# Patient Record
Sex: FEMALE | Race: BLACK OR AFRICAN AMERICAN | Employment: UNEMPLOYED | URBAN - METROPOLITAN AREA
[De-identification: names, ages, dates, MRNs, and addresses within clinical notes are randomized per-mention and may not be internally consistent; named-entity substitution may affect disease eponyms.]

---

## 2021-10-07 ENCOUNTER — HOSPITAL ENCOUNTER (EMERGENCY)
Age: 46
Discharge: HOME | End: 2021-10-07
Attending: EMERGENCY MEDICINE
Payer: COMMERCIAL

## 2021-10-07 ENCOUNTER — APPOINTMENT (OUTPATIENT)
Dept: GENERAL RADIOLOGY | Age: 46
End: 2021-10-07
Attending: EMERGENCY MEDICINE
Payer: COMMERCIAL

## 2021-10-07 VITALS
OXYGEN SATURATION: 100 % | HEIGHT: 71 IN | SYSTOLIC BLOOD PRESSURE: 135 MMHG | TEMPERATURE: 98.9 F | WEIGHT: 293 LBS | BODY MASS INDEX: 41.02 KG/M2 | DIASTOLIC BLOOD PRESSURE: 99 MMHG | HEART RATE: 84 BPM | RESPIRATION RATE: 18 BRPM

## 2021-10-07 DIAGNOSIS — T14.8XXA MUSCLE STRAIN: Primary | ICD-10-CM

## 2021-10-07 PROCEDURE — 73610 X-RAY EXAM OF ANKLE: CPT

## 2021-10-07 PROCEDURE — 74011250637 HC RX REV CODE- 250/637: Performed by: PHYSICIAN ASSISTANT

## 2021-10-07 PROCEDURE — 99283 EMERGENCY DEPT VISIT LOW MDM: CPT

## 2021-10-07 RX ORDER — IBUPROFEN 800 MG/1
800 TABLET ORAL
Qty: 30 TABLET | Refills: 0 | Status: SHIPPED | OUTPATIENT
Start: 2021-10-07 | End: 2021-10-17

## 2021-10-07 RX ORDER — IBUPROFEN 800 MG/1
800 TABLET ORAL
Status: COMPLETED | OUTPATIENT
Start: 2021-10-07 | End: 2021-10-07

## 2021-10-07 RX ADMIN — IBUPROFEN 800 MG: 800 TABLET, FILM COATED ORAL at 17:55

## 2021-10-07 NOTE — ED PROVIDER NOTES
Patient states that about 230 to 3:00 this afternoon she was pushing a shopping cart in a grocery store and hit some water her left leg went suddenly backwards, she caught herself on the shopping cart and did not fall to the ground but has pain to the entire left leg. She has not used any over-the-counter medicines for pain she was able to drive herself here    The history is provided by the patient. Leg Pain   This is a new problem. The current episode started 1 to 2 hours ago. The problem occurs constantly. The problem has not changed since onset. Pain location: Entire left leg and ankle. The quality of the pain is described as aching. The pain is at a severity of 10/10. The pain is mild. Associated symptoms include stiffness. The symptoms are aggravated by activity and palpation. She has tried nothing for the symptoms. The treatment provided no relief. There has been no history of extremity trauma.         Past Medical History:   Diagnosis Date    Anemia     Asthma     Hyperlipidemia     Obesity     Other ill-defined conditions     aneimia       Past Surgical History:   Procedure Laterality Date    HX OTHER SURGICAL      hernia    HX TUBAL LIGATION           Family History:   Problem Relation Age of Onset    Diabetes Other         type II       Social History     Socioeconomic History    Marital status: SINGLE     Spouse name: Not on file    Number of children: Not on file    Years of education: Not on file    Highest education level: Not on file   Occupational History    Not on file   Tobacco Use    Smoking status: Never Smoker    Smokeless tobacco: Never Used   Substance and Sexual Activity    Alcohol use: No    Drug use: No    Sexual activity: Not Currently   Other Topics Concern    Not on file   Social History Narrative    Not on file     Social Determinants of Health     Financial Resource Strain:     Difficulty of Paying Living Expenses:    Food Insecurity:     Worried About Running Out of Food in the Last Year:    [address] in the Last Year:    Transportation Needs:     Lack of Transportation (Medical):  Lack of Transportation (Non-Medical):    Physical Activity:     Days of Exercise per Week:     Minutes of Exercise per Session:    Stress:     Feeling of Stress :    Social Connections:     Frequency of Communication with Friends and Family:     Frequency of Social Gatherings with Friends and Family:     Attends [employer] Services:     Active Member of Clubs or Organizations:     Attends Club or Organization Meetings:     Marital Status:    Intimate Partner Violence:     Fear of Current or Ex-Partner:     Emotionally Abused:     Physically Abused:     Sexually Abused: ALLERGIES: Patient has no known allergies. Review of Systems   Musculoskeletal: Positive for stiffness. All other systems reviewed and are negative. Vitals:    10/07/21 1615 10/07/21 1723   BP: (!) 135/102    Pulse: 84    Resp: 17    Temp: 98.9 °F (37.2 °C)    SpO2: 100% 100%   Weight: 140.6 kg (310 lb)    Height: 5' 11\" (1.803 m)             Physical Exam  Vitals and nursing note reviewed. Constitutional:       General: She is not in acute distress. Appearance: Normal appearance. She is well-developed. She is obese. She is not diaphoretic. HENT:      Head: Normocephalic and atraumatic. Eyes:      Pupils: Pupils are equal, round, and reactive to light. Cardiovascular:      Rate and Rhythm: Normal rate and regular rhythm. Pulmonary:      Effort: Pulmonary effort is normal.      Breath sounds: Normal breath sounds. Abdominal:      General: Bowel sounds are normal.      Palpations: Abdomen is soft. Musculoskeletal:         General: Tenderness present. Normal range of motion. Cervical back: Normal range of motion and neck supple.       Comments: Patient has diffuse tenderness to the entire left leg including buttocks area entire thigh down through the knee entire lower leg into the ankle. Full but painful range of motion of both hip knee and ankle. No pain to the lower back. No pain to the right lower extremity. Patient feels she may have twisted her left ankle and may have more pain to the ankle area, no abrasions noted to the foot intact pedal pulses Achilles tendon is intact   Skin:     General: Skin is warm. Neurological:      Mental Status: She is alert and oriented to person, place, and time. MDM  Number of Diagnoses or Management Options  Diagnosis management comments: Left ankle x-rays without any fractures.   Feel this is a muscle strain will treat with ibuprofen and ice       Amount and/or Complexity of Data Reviewed  Tests in the radiology section of CPT®: ordered and reviewed  Review and summarize past medical records: yes    Risk of Complications, Morbidity, and/or Mortality  Presenting problems: low  Diagnostic procedures: low  Management options: low    Patient Progress  Patient progress: improved         Procedures

## 2021-10-07 NOTE — ED NOTES
I have reviewed discharge instructions with the patient. The patient verbalized understanding. Patient left ED via Discharge Method: ambulatory to Home with self. Opportunity for questions and clarification provided. Patient given 1 scripts. To continue your aftercare when you leave the hospital, you may receive an automated call from our care team to check in on how you are doing. This is a free service and part of our promise to provide the best care and service to meet your aftercare needs.  If you have questions, or wish to unsubscribe from this service please call [phone]. Thank you for Choosing our [facility] Emergency Department.

## 2021-10-07 NOTE — ED TRIAGE NOTES
Pt arrives via pov c/o a slip at the grocery store today, reporting a burning sensation to the left upper leg and hip area. Pt reports did not fall all the way down. Pt denies LOC or near syncope. Reporting left ankle pain too.

## 2023-07-27 ENCOUNTER — HOSPITAL ENCOUNTER (EMERGENCY)
Age: 48
Discharge: HOME | End: 2023-07-27
Attending: EMERGENCY MEDICINE

## 2023-07-27 VITALS
RESPIRATION RATE: 18 BRPM | SYSTOLIC BLOOD PRESSURE: 142 MMHG | WEIGHT: 280 LBS | HEART RATE: 82 BPM | BODY MASS INDEX: 39.2 KG/M2 | OXYGEN SATURATION: 98 % | HEIGHT: 71 IN | DIASTOLIC BLOOD PRESSURE: 92 MMHG | TEMPERATURE: 98.4 F
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
## 2023-07-27 PROCEDURE — 99283 EMERGENCY DEPT VISIT LOW MDM: CPT

## 2023-07-27 RX ORDER — SULFAMETHOXAZOLE AND TRIMETHOPRIM 800; 160 MG/1; MG/1
1 TABLET ORAL 2 TIMES DAILY
Qty: 20 TABLET | Refills: 0 | Status: SHIPPED | OUTPATIENT
Start: 2023-07-27 | End: 2023-08-06

## 2023-07-27 ASSESSMENT — LIFESTYLE VARIABLES
HOW MANY STANDARD DRINKS CONTAINING ALCOHOL DO YOU HAVE ON A TYPICAL DAY: 1 OR 2
HOW OFTEN DO YOU HAVE A DRINK CONTAINING ALCOHOL: MONTHLY OR LESS

## 2023-07-27 ASSESSMENT — PAIN SCALES - GENERAL: PAINLEVEL_OUTOF10: 8

## 2023-07-27 ASSESSMENT — PAIN DESCRIPTION - ORIENTATION: ORIENTATION: RIGHT

## 2023-07-27 ASSESSMENT — PAIN DESCRIPTION - LOCATION: LOCATION: FACE

## 2023-07-27 ASSESSMENT — PAIN - FUNCTIONAL ASSESSMENT: PAIN_FUNCTIONAL_ASSESSMENT: 0-10

## 2023-07-27 NOTE — DISCHARGE INSTRUCTIONS
Use antibiotics twice a day with food use antibiotic ointment to area twice a day, see your primary md for recheck

## 2023-07-27 NOTE — ED TRIAGE NOTES
Pt has small scab with slight swelling and redness on the right side of her face lateral to her eye. reports feeling an insect bite at work  last week. Pt reports burning and tenderness. No fevers.

## 2024-04-17 ENCOUNTER — HOSPITAL ENCOUNTER (EMERGENCY)
Age: 49
Discharge: HOME | End: 2024-04-17
Attending: EMERGENCY MEDICINE
Payer: MEDICAID

## 2024-04-17 ENCOUNTER — APPOINTMENT (OUTPATIENT)
Dept: GENERAL RADIOLOGY | Age: 49
End: 2024-04-17
Payer: MEDICAID

## 2024-04-17 VITALS
HEART RATE: 79 BPM | BODY MASS INDEX: 39.2 KG/M2 | TEMPERATURE: 98 F | RESPIRATION RATE: 16 BRPM | WEIGHT: 280 LBS | SYSTOLIC BLOOD PRESSURE: 134 MMHG | DIASTOLIC BLOOD PRESSURE: 76 MMHG | OXYGEN SATURATION: 99 % | HEIGHT: 71 IN

## 2024-04-17 DIAGNOSIS — S80.211A ABRASION, RIGHT KNEE, INITIAL ENCOUNTER: ICD-10-CM

## 2024-04-17 DIAGNOSIS — M25.571 ACUTE RIGHT ANKLE PAIN: Primary | ICD-10-CM

## 2024-04-17 LAB — HCG UR QL: NEGATIVE

## 2024-04-17 PROCEDURE — 81025 URINE PREGNANCY TEST: CPT

## 2024-04-17 PROCEDURE — 73610 X-RAY EXAM OF ANKLE: CPT

## 2024-04-17 PROCEDURE — 6370000000 HC RX 637 (ALT 250 FOR IP): Performed by: EMERGENCY MEDICINE

## 2024-04-17 PROCEDURE — 99283 EMERGENCY DEPT VISIT LOW MDM: CPT

## 2024-04-17 RX ORDER — HYDROCODONE BITARTRATE AND ACETAMINOPHEN 7.5; 325 MG/1; MG/1
1 TABLET ORAL EVERY 8 HOURS PRN
Qty: 10 TABLET | Refills: 0 | Status: SHIPPED | OUTPATIENT
Start: 2024-04-17 | End: 2024-04-22

## 2024-04-17 RX ORDER — HYDROCODONE BITARTRATE AND ACETAMINOPHEN 7.5; 325 MG/1; MG/1
1 TABLET ORAL ONCE
Status: COMPLETED | OUTPATIENT
Start: 2024-04-17 | End: 2024-04-17

## 2024-04-17 RX ADMIN — HYDROCODONE BITARTRATE AND ACETAMINOPHEN 1 TABLET: 7.5; 325 TABLET ORAL at 20:43

## 2024-04-17 ASSESSMENT — PAIN - FUNCTIONAL ASSESSMENT
PAIN_FUNCTIONAL_ASSESSMENT: 0-10
PAIN_FUNCTIONAL_ASSESSMENT: 0-10

## 2024-04-17 ASSESSMENT — PAIN DESCRIPTION - FREQUENCY: FREQUENCY: CONTINUOUS

## 2024-04-17 ASSESSMENT — LIFESTYLE VARIABLES
HOW OFTEN DO YOU HAVE A DRINK CONTAINING ALCOHOL: MONTHLY OR LESS
HOW MANY STANDARD DRINKS CONTAINING ALCOHOL DO YOU HAVE ON A TYPICAL DAY: 1 OR 2

## 2024-04-17 ASSESSMENT — PAIN SCALES - GENERAL
PAINLEVEL_OUTOF10: 5
PAINLEVEL_OUTOF10: 10

## 2024-04-17 ASSESSMENT — PAIN DESCRIPTION - PAIN TYPE: TYPE: ACUTE PAIN

## 2024-04-17 ASSESSMENT — PAIN DESCRIPTION - ORIENTATION: ORIENTATION: RIGHT

## 2024-04-17 ASSESSMENT — PAIN DESCRIPTION - DESCRIPTORS: DESCRIPTORS: ACHING;DISCOMFORT

## 2024-04-17 ASSESSMENT — PAIN DESCRIPTION - LOCATION: LOCATION: ANKLE

## 2024-04-17 NOTE — ED TRIAGE NOTES
Pt fell Sunday, slipped on rocks, seen at urgent care and told had ankle fracture and has temporary splint in place. Waiting for ortho follow up appointment but having worsened pain.     A&OX4

## 2024-04-17 NOTE — ED PROVIDER NOTES
Emergency Department Provider Note       PCP: No primary care provider on file.   Age: 48 y.o.   Sex: female     DISPOSITION Decision To Discharge 04/17/2024 07:39:49 PM       ICD-10-CM    1. Acute right ankle pain  M25.571 HYDROcodone-acetaminophen (LORCET PLUS) 7.5-325 MG per tablet     [facility]      2. Abrasion, right knee, initial encounter  S80.211A           Medical Decision Making     Ankle pain.  No evidence for any compartment syndromes.  Repeat imaging to check for any displacement.  Pain control.     1 acute, uncomplicated illness or injury.  Prescription drug management performed.    I independently ordered and reviewed each unique test.  I reviewed external records: ED visit note from an outside group.  Urgent care notes regarding diagnosis and prescriptions    I interpreted the X-rays imaging of ankle f without definite fracture.  Patient does have a plaster splint which may interfere with definitive diagnosis..      History     48-year-old female slipped and fell about 3 days ago.  Increasing right leg pain where she struck her knee and twisted her ankle.  No other injuries.  Patient went to urgent care 2 days ago.  States he diagnosed an ankle fracture and referred to orthopedist but had not heard from anybody.  Placed on pain medication but not helping.  Has a lot of pain with weightbearing and appears to shift a little bit when she tries to bear weight on that right ankle.    I have reviewed records.  Patient was felt to have a distal fibular fracture on the right.  Also abrasions I will had a early infection of the right knee.  Patient was placed on diclofenac and Keflex.    The history is provided by the patient.     Physical Exam     Vitals signs and nursing note reviewed:  Vitals:    04/17/24 1817   BP: 130/72   Pulse: 75   Resp: 17   Temp: 98 °F (36.7 °C)   TempSrc: Oral   SpO2: 100%   Weight: 127 kg (280 lb)   Height: 1.803 m (5' 11\")      Physical

## 2024-04-17 NOTE — DISCHARGE INSTRUCTIONS
Rest.  May elevate and use cool compresses.  Regarding abrasion on the knee, clean gently soap and water once every day or 2 and apply antibiotic ointment.  Wrap with gauze if needed.  Regarding the ankle, may take off the boot at night but try to minimally weight-bear with the boot.  Call orthopedic office for appointment.  Recheck sooner for worse pain numbness or other concerns

## 2024-04-18 NOTE — ED NOTES
Patient mobility status  with difficulty, uses a walker boot . Provider aware     I have reviewed discharge instructions with the patient.  The patient verbalized understanding.    Patient left ED via Discharge Method: ambulatory to Home with her adult Child.    Opportunity for questions and clarification provided.     Patient given 1 scripts.           Sendy Saeed, RN  04/17/24 9343

## 2024-04-18 NOTE — DISCHARGE INSTR - COC
Continuity of Care Form    Patient Name: Syl Abdul   :  1975  MRN:  [id number]    Admit date:  2024  Discharge date:  ***    Code Status Order: No Order   Advance Directives:     Admitting Physician:  No admitting provider for patient encounter.  PCP: No primary care provider on file.    Discharging Nurse: ***  Discharging Hospital Unit/Room#: D04/D04  Discharging Unit Phone Number: ***    Emergency Contact:   Extended Emergency Contact Information  Primary Emergency Contact: Gaye Alvarez  Home Phone: [phone]  Relation: Other    Past Surgical History:  Past Surgical History:   Procedure Laterality Date    OTHER SURGICAL HISTORY      hernia    TUBAL LIGATION         Immunization History:     There is no immunization history on file for this patient.    Active Problems:  Patient Active Problem List   Diagnosis Code    Asthma J45.909    Hyperlipidemia E78.5    Obesity E66.9    Anemia D64.9    Bite, insect W57.XXXA       Isolation/Infection:   Isolation            No Isolation          Patient Infection Status       None to display            Nurse Assessment:  Last Vital Signs: /76   Pulse 79   Temp 98 °F (36.7 °C) (Oral)   Resp 16   Ht 1.803 m (5' 11\")   Wt 127 kg (280 lb)   SpO2 99%   BMI 39.05 kg/m²     Last documented pain score (0-10 scale): Pain Level: 5  Last Weight:   Wt Readings from Last 1 Encounters:   24 127 kg (280 lb)     Mental Status:  {IP PT MENTAL STATUS:67817}    IV Access:  { ELIA IV ACCESS:929019166}    Nursing Mobility/ADLs:  Walking   {CHP DME ADLs:894356029}  Transfer  {CHP DME ADLs:168565565}  Bathing  {CHP DME ADLs:765009223}  Dressing  {CHP DME ADLs:817196621}  Toileting  {CHP DME ADLs:115316820}  Feeding  {CHP DME ADLs:134460500}  Med Admin  {CHP DME ADLs:152386872}  Med Delivery   { ELIA MED Delivery:223715267}    Wound Care Documentation and Therapy:        Elimination:  Continence:   Bowel: {YES / NO:85941}  Bladder: {YES /

## 2024-04-19 ENCOUNTER — OFFICE VISIT (OUTPATIENT)
Dept: ORTHOPEDIC SURGERY | Age: 49
End: 2024-04-19

## 2024-04-19 DIAGNOSIS — S99.911A INJURY OF RIGHT ANKLE, INITIAL ENCOUNTER: Primary | ICD-10-CM

## 2024-04-19 DIAGNOSIS — S82.61XA CLOSED DISPLACED FRACTURE OF LATERAL MALLEOLUS OF RIGHT FIBULA, INITIAL ENCOUNTER: ICD-10-CM

## 2024-04-19 RX ORDER — CEPHALEXIN 500 MG/1
1 TABLET ORAL 2 TIMES DAILY
COMMUNITY
Start: 2024-04-15 | End: 2024-04-22

## 2024-04-19 RX ORDER — DICLOFENAC SODIUM 75 MG/1
TABLET, DELAYED RELEASE ORAL
COMMUNITY
Start: 2024-04-15

## 2024-04-19 NOTE — PROGRESS NOTES
Name: Syl Abdul  YOB: 1975  Gender: female  MRN: [id number]    CC: Right ankle injury    HPI:   04/13/2024: Right ankle injury slipping on rocks  04/15/2024: Urgent care:  Placed in a splint for LM fracture   04/17/2024: [facility] ED: Placed in a short boot: No fracture noted on x-ray  04/19/2024: Initial visit: Right ankle injury     ROS/Meds/PSH/PMH/FH/SH: reviewed today    Tobacco:  reports that she has never smoked. She has never used smokeless tobacco.     Physical Examination:  Patient appears to be alert and oriented with acceptable appearance.  No obvious distress or SOB  CV: appears to have acceptable vascular color and capillary refill  Neuro: appears to have mostly intact light touch sensation   Skin: Right lateral ankle soft tissue swelling  MS: No standing or gait exam  Right = anterior knee abrasion but no knee pain  Right = no proximal tib-fib pain; no interosseous pain  Right = no medial ankle: Deltoid pain  Right = no midfoot pain  Right = low ankle to dorsal lateral hindfoot pain    XR: Right: Standing AP lateral mortise ankle plus AP oblique foot taken on return  XR Impression:  As above      Reviewed Test/Records/Documents:   040 15 2024: [facility] urgent care x-rays: Reveals distal transverse lateral malleolar fracture  04/17/2024: [facility] ED: Reflects right knee abrasion prescribed: Keflex: Diclofenac  04/17/2024: [facility] ED: Reflects right ankle pain after slip and fall 3 days prior: Reflects urgent care 2 days prior: Diagnosed ankle fracture: ED reflects no ankle fracture: Prescribed Lorcet 7.5 mg  04/17/2024: Right ankle [facility] ED x-ray: Negative right ankle:   My review of [facility] XR = distal transverse lateral malleolar fracture  My review of [facility] XR in splint obscures ankle - no acute fracture appreciated   Both XR = stable appearing mortise and syndesmosis    Assessment:    Right lateral ankle injury: Distal transverse lateral malleolar fracture  Right lateral hindfoot injury    Plan:   The

## 2024-05-10 ENCOUNTER — OFFICE VISIT (OUTPATIENT)
Dept: ORTHOPEDIC SURGERY | Age: 49
End: 2024-05-10

## 2024-05-10 VITALS — WEIGHT: 280 LBS | HEIGHT: 71 IN | BODY MASS INDEX: 39.2 KG/M2

## 2024-05-10 DIAGNOSIS — S82.61XG CLOSED DISPLACED FRACTURE OF LATERAL MALLEOLUS OF RIGHT FIBULA WITH DELAYED HEALING, SUBSEQUENT ENCOUNTER: ICD-10-CM

## 2024-05-10 DIAGNOSIS — S99.911A INJURY OF RIGHT ANKLE, INITIAL ENCOUNTER: Primary | ICD-10-CM

## 2024-05-10 NOTE — PROGRESS NOTES
Name: Syl Abdul  YOB: 1975  Gender: female  MRN: [id number]    05/10/2024: She is full weightbearing    HPI:   04/13/2024: Right ankle injury slipping on rocks  04/15/2024: Urgent care:  Placed in a splint for LM fracture   04/17/2024: [facility] ED: Placed in a short boot: No fracture noted on x-ray  04/19/2024: Initial visit: Right ankle injury     ROS/Meds/PSH/PMH/FH/SH: reviewed today    Tobacco:  reports that she has never smoked. She has never used smokeless tobacco.     Physical Examination:  Patient appears to be alert and oriented with acceptable appearance.  No obvious distress or SOB  CV: appears to have acceptable vascular color and capillary refill  Neuro: appears to have mostly intact light touch sensation   Skin: Right = lateral ankle soft tissue swelling  MS: Standing: Plantigrade: Gait 3D boot  Right = lateral ankle pain only  Right = good ankle/foot motion    XR: Right: Standing AP lateral mortise ankle plus AP oblique foot taken today with stable distal transverse lateral malleolar fracture below ankle mortise; midfoot hindfoot appear to be intact  XR Impression:  As above      Reviewed Test/Records/Documents:   040 15 2024: [facility] urgent care x-rays: Reveals distal transverse lateral malleolar fracture  04/17/2024: [facility] ED: Reflects right knee abrasion prescribed: Keflex: Diclofenac  04/17/2024: [facility] ED: Reflects right ankle pain after slip and fall 3 days prior: Reflects urgent care 2 days prior: Diagnosed ankle fracture: ED reflects no ankle fracture: Prescribed Lorcet 7.5 mg  04/17/2024: Right ankle [facility] ED x-ray: Negative right ankle:   My review of [facility] XR = distal transverse lateral malleolar fracture  My review of [facility] XR in splint obscures ankle - no acute fracture appreciated   Both XR = stable appearing mortise and syndesmosis    Assessment:    Right lateral ankle injury: Distal transverse lateral malleolar fracture     Plan:   The patient and I discussed the above assessment. We

## 2024-05-29 ENCOUNTER — OFFICE VISIT (OUTPATIENT)
Dept: ORTHOPEDIC SURGERY | Age: 49
End: 2024-05-29
Payer: MEDICAID

## 2024-05-29 DIAGNOSIS — S99.911A INJURY OF RIGHT ANKLE, INITIAL ENCOUNTER: Primary | ICD-10-CM

## 2024-05-29 DIAGNOSIS — S82.61XG CLOSED DISPLACED FRACTURE OF LATERAL MALLEOLUS OF RIGHT FIBULA WITH DELAYED HEALING, SUBSEQUENT ENCOUNTER: ICD-10-CM

## 2024-05-29 PROCEDURE — 99213 OFFICE O/P EST LOW 20 MIN: CPT | Performed by: ORTHOPAEDIC SURGERY

## 2024-05-29 NOTE — PROGRESS NOTES
Name: Syl Abdul  YOB: 1975  Gender: female  MRN: [id number]    05/29/2024: No new concerns    HPI:   04/13/2024: Right ankle injury slipping on rocks  04/15/2024: Urgent care:  Placed in a splint for LM fracture   04/17/2024: [facility] ED: Placed in a short boot: No fracture noted on x-ray  04/19/2024: Initial visit: Right ankle injury     ROS/Meds/PSH/PMH/FH/SH: reviewed today    Tobacco:  reports that she has never smoked. She has never used smokeless tobacco.     Physical Examination:  Patient appears to be alert and oriented with acceptable appearance.  No obvious distress or SOB  CV: appears to have acceptable vascular color and capillary refill  Neuro: appears to have mostly intact light touch sensation   Skin: Right = less lateral ankle soft tissue swelling  MS: Standing: Plantigrade: Gait 3D boot  Right = minimal lateral ankle pain  Right = full ankle/foot motion; 5/5 strength; no instability or crepitance    XR: Right: Standing AP lateral mortise ankle plus AP oblique foot taken today with stable distal transverse lateral malleolar fracture below ankle mortise; midfoot hindfoot appear to be intact  XR Impression:  As above      Reviewed Test/Records/Documents:   04/15/2024: [facility] urgent care x-rays: Reveals distal transverse lateral malleolar fracture  04/17/2024: [facility] ED: Reflects right knee abrasion prescribed: Keflex: Diclofenac  04/17/2024: [facility] ED: Reflects right ankle pain after slip and fall 3 days prior: Reflects urgent care 2 days prior: Diagnosed ankle fracture: ED reflects no ankle fracture: Prescribed Lorcet 7.5 mg  04/17/2024: Right ankle [facility] ED x-ray: Negative right ankle:   My review of [facility] XR = distal transverse lateral malleolar fracture  My review of [facility] XR in splint obscures ankle - no acute fracture appreciated   Both XR = stable appearing mortise and syndesmosis    Assessment:    Right lateral ankle injury: Distal transverse lateral malleolar fracture     Plan:   The patient

## 2024-05-30 NOTE — PROGRESS NOTES
The patient was prescribed a Wraptor brace for the patient's rightfoot. The patient wears a size NA shoe and I fitted the patient with a L brace. I explained how to fit the brace properly by pulling the lace tabs across top of foot first then under arch and lastly pulling the strap up firmly and attaching to the lateral Velcro strip. Thus forming a figure 8 across the ankle joint. Once the figure 8 is completed they are to secure the top (short circumferential) straps to help avoid the straps from loosening with normal wear.  The patient was able to demonstrate proper fitting in office to ensure compliance with device and acknowledged satisfaction with current fit.     Patient read and signed documenting they understand and agree to [facility]'s current DME return policy.

## 2024-06-19 ENCOUNTER — OFFICE VISIT (OUTPATIENT)
Dept: ORTHOPEDIC SURGERY | Age: 49
End: 2024-06-19
Payer: MEDICAID

## 2024-06-19 DIAGNOSIS — S99.911D INJURY OF RIGHT ANKLE, SUBSEQUENT ENCOUNTER: Primary | ICD-10-CM

## 2024-06-19 DIAGNOSIS — S82.61XG CLOSED DISPLACED FRACTURE OF LATERAL MALLEOLUS OF RIGHT FIBULA WITH DELAYED HEALING, SUBSEQUENT ENCOUNTER: ICD-10-CM

## 2024-06-19 PROCEDURE — 99213 OFFICE O/P EST LOW 20 MIN: CPT | Performed by: ORTHOPAEDIC SURGERY

## 2024-06-19 NOTE — PROGRESS NOTES
Name: Syl Abdul  YOB: 1975  Gender: female  MRN: [id number]    05/29/2024: No new concerns  06/19/2024: Doing well    HPI:   04/13/2024: Right ankle injury slipping on rocks  04/15/2024: Urgent care:  Placed in a splint for LM fracture   04/17/2024: [facility] ED: Placed in a short boot: No fracture noted on x-ray  04/19/2024: Initial visit: Right ankle injury     ROS/Meds/PSH/PMH/FH/SH: reviewed today    Tobacco:  reports that she has never smoked. She has never used smokeless tobacco.     Physical Examination:  Patient appears to be alert and oriented with acceptable appearance.  No obvious distress or SOB  CV: appears to have acceptable vascular color and capillary refill  Neuro: appears to have mostly intact light touch sensation   Skin: Right = minimal lateral ankle soft tissue swelling  MS: Standing: Plantigrade: Gait full   Right = full ankle/foot motion; 5/5 strength; no instability or crepitance    XR: Right: Standing AP lateral mortise ankle plus AP oblique foot taken today with stable almost healed distal transverse lateral malleolar fracture below ankle mortise; midfoot hindfoot appear to be intact  XR Impression:  As above      Reviewed Test/Records/Documents:   04/15/2024: [facility] urgent care x-rays: Reveals distal transverse lateral malleolar fracture  04/17/2024: [facility] ED: Reflects right knee abrasion prescribed: Keflex: Diclofenac  04/17/2024: [facility] ED: Reflects right ankle pain after slip and fall 3 days prior: Reflects urgent care 2 days prior: Diagnosed ankle fracture: ED reflects no ankle fracture: Prescribed Lorcet 7.5 mg  04/17/2024: Right ankle [facility] ED x-ray: Negative right ankle:   My review of [facility] XR = distal transverse lateral malleolar fracture  My review of [facility] XR in splint obscures ankle - no acute fracture appreciated   Both XR = stable appearing mortise and syndesmosis    Assessment:    Right lateral ankle injury: Distal transverse lateral malleolar fracture     Plan:   The